# Patient Record
Sex: MALE | Race: WHITE | HISPANIC OR LATINO | URBAN - METROPOLITAN AREA
[De-identification: names, ages, dates, MRNs, and addresses within clinical notes are randomized per-mention and may not be internally consistent; named-entity substitution may affect disease eponyms.]

---

## 2017-09-22 ENCOUNTER — GENERIC CONVERSION - ENCOUNTER (OUTPATIENT)
Dept: OTHER | Facility: OTHER | Age: 27
End: 2017-09-22

## 2018-01-12 NOTE — MISCELLANEOUS
Provider Comments  Provider Comments:   phone not in service      Signatures   Electronically signed by : BARTOLO Brian ; Sep 22 2017  8:36PM EST                       (Author)